# Patient Record
Sex: FEMALE | Race: WHITE | Employment: OTHER | URBAN - METROPOLITAN AREA
[De-identification: names, ages, dates, MRNs, and addresses within clinical notes are randomized per-mention and may not be internally consistent; named-entity substitution may affect disease eponyms.]

---

## 2018-12-13 ENCOUNTER — APPOINTMENT (OUTPATIENT)
Dept: ULTRASOUND IMAGING | Facility: CLINIC | Age: 68
End: 2018-12-13
Attending: EMERGENCY MEDICINE
Payer: COMMERCIAL

## 2018-12-13 ENCOUNTER — APPOINTMENT (OUTPATIENT)
Dept: CT IMAGING | Facility: CLINIC | Age: 68
End: 2018-12-13
Attending: EMERGENCY MEDICINE
Payer: COMMERCIAL

## 2018-12-13 ENCOUNTER — HOSPITAL ENCOUNTER (EMERGENCY)
Facility: CLINIC | Age: 68
Discharge: HOME | End: 2018-12-14
Attending: EMERGENCY MEDICINE | Admitting: EMERGENCY MEDICINE
Payer: COMMERCIAL

## 2018-12-13 VITALS
RESPIRATION RATE: 18 BRPM | WEIGHT: 120 LBS | OXYGEN SATURATION: 99 % | TEMPERATURE: 97.7 F | SYSTOLIC BLOOD PRESSURE: 119 MMHG | DIASTOLIC BLOOD PRESSURE: 70 MMHG

## 2018-12-13 DIAGNOSIS — R10.13 ABDOMINAL PAIN, EPIGASTRIC: ICD-10-CM

## 2018-12-13 LAB
ALBUMIN SERPL-MCNC: 3.9 G/DL (ref 3.4–5)
ALP SERPL-CCNC: 84 U/L (ref 40–150)
ALT SERPL W P-5'-P-CCNC: 24 U/L (ref 0–50)
ANION GAP SERPL CALCULATED.3IONS-SCNC: 5 MMOL/L (ref 3–14)
AST SERPL W P-5'-P-CCNC: 20 U/L (ref 0–45)
BASOPHILS # BLD AUTO: 0.1 10E9/L (ref 0–0.2)
BASOPHILS NFR BLD AUTO: 0.5 %
BILIRUB DIRECT SERPL-MCNC: <0.1 MG/DL (ref 0–0.2)
BILIRUB SERPL-MCNC: 0.2 MG/DL (ref 0.2–1.3)
BUN SERPL-MCNC: 12 MG/DL (ref 7–30)
CALCIUM SERPL-MCNC: 8.6 MG/DL (ref 8.5–10.1)
CHLORIDE SERPL-SCNC: 106 MMOL/L (ref 94–109)
CO2 SERPL-SCNC: 29 MMOL/L (ref 20–32)
CREAT SERPL-MCNC: 0.63 MG/DL (ref 0.52–1.04)
DIFFERENTIAL METHOD BLD: NORMAL
EOSINOPHIL # BLD AUTO: 0.2 10E9/L (ref 0–0.7)
EOSINOPHIL NFR BLD AUTO: 2 %
ERYTHROCYTE [DISTWIDTH] IN BLOOD BY AUTOMATED COUNT: 13.2 % (ref 10–15)
GFR SERPL CREATININE-BSD FRML MDRD: >90 ML/MIN/1.7M2
GLUCOSE SERPL-MCNC: 90 MG/DL (ref 70–99)
HCT VFR BLD AUTO: 44.2 % (ref 35–47)
HGB BLD-MCNC: 14.3 G/DL (ref 11.7–15.7)
IMM GRANULOCYTES # BLD: 0 10E9/L (ref 0–0.4)
IMM GRANULOCYTES NFR BLD: 0.2 %
LIPASE SERPL-CCNC: 334 U/L (ref 73–393)
LYMPHOCYTES # BLD AUTO: 4.7 10E9/L (ref 0.8–5.3)
LYMPHOCYTES NFR BLD AUTO: 46.1 %
MCH RBC QN AUTO: 31 PG (ref 26.5–33)
MCHC RBC AUTO-ENTMCNC: 32.4 G/DL (ref 31.5–36.5)
MCV RBC AUTO: 96 FL (ref 78–100)
MONOCYTES # BLD AUTO: 0.8 10E9/L (ref 0–1.3)
MONOCYTES NFR BLD AUTO: 8 %
NEUTROPHILS # BLD AUTO: 4.4 10E9/L (ref 1.6–8.3)
NEUTROPHILS NFR BLD AUTO: 43.2 %
NRBC # BLD AUTO: 0 10*3/UL
NRBC BLD AUTO-RTO: 0 /100
PLATELET # BLD AUTO: 271 10E9/L (ref 150–450)
POTASSIUM SERPL-SCNC: 3.4 MMOL/L (ref 3.4–5.3)
PROT SERPL-MCNC: 7.5 G/DL (ref 6.8–8.8)
RBC # BLD AUTO: 4.61 10E12/L (ref 3.8–5.2)
SODIUM SERPL-SCNC: 140 MMOL/L (ref 133–144)
TROPONIN I SERPL-MCNC: <0.015 UG/L (ref 0–0.04)
WBC # BLD AUTO: 10.2 10E9/L (ref 4–11)

## 2018-12-13 PROCEDURE — 25000132 ZZH RX MED GY IP 250 OP 250 PS 637: Performed by: EMERGENCY MEDICINE

## 2018-12-13 PROCEDURE — 99285 EMERGENCY DEPT VISIT HI MDM: CPT | Mod: 25

## 2018-12-13 PROCEDURE — 85025 COMPLETE CBC W/AUTO DIFF WBC: CPT | Performed by: EMERGENCY MEDICINE

## 2018-12-13 PROCEDURE — 25000128 H RX IP 250 OP 636: Performed by: EMERGENCY MEDICINE

## 2018-12-13 PROCEDURE — 76705 ECHO EXAM OF ABDOMEN: CPT

## 2018-12-13 PROCEDURE — 83690 ASSAY OF LIPASE: CPT | Performed by: EMERGENCY MEDICINE

## 2018-12-13 PROCEDURE — 83690 ASSAY OF LIPASE: CPT

## 2018-12-13 PROCEDURE — 80048 BASIC METABOLIC PNL TOTAL CA: CPT | Performed by: EMERGENCY MEDICINE

## 2018-12-13 PROCEDURE — 74177 CT ABD & PELVIS W/CONTRAST: CPT

## 2018-12-13 PROCEDURE — 84484 ASSAY OF TROPONIN QUANT: CPT | Performed by: EMERGENCY MEDICINE

## 2018-12-13 PROCEDURE — 25000125 ZZHC RX 250: Performed by: EMERGENCY MEDICINE

## 2018-12-13 PROCEDURE — 80076 HEPATIC FUNCTION PANEL: CPT

## 2018-12-13 RX ORDER — IOPAMIDOL 755 MG/ML
500 INJECTION, SOLUTION INTRAVASCULAR ONCE
Status: COMPLETED | OUTPATIENT
Start: 2018-12-13 | End: 2018-12-13

## 2018-12-13 RX ADMIN — LIDOCAINE HYDROCHLORIDE 30 ML: 20 SOLUTION ORAL; TOPICAL at 21:38

## 2018-12-13 RX ADMIN — IOPAMIDOL 56 ML: 755 INJECTION, SOLUTION INTRAVENOUS at 23:31

## 2018-12-13 RX ADMIN — SODIUM CHLORIDE 74 ML: 9 INJECTION, SOLUTION INTRAVENOUS at 23:31

## 2018-12-13 SDOH — HEALTH STABILITY: MENTAL HEALTH: HOW OFTEN DO YOU HAVE A DRINK CONTAINING ALCOHOL?: NEVER

## 2018-12-13 ASSESSMENT — ENCOUNTER SYMPTOMS
SHORTNESS OF BREATH: 0
FEVER: 0
VOMITING: 0
NAUSEA: 0
ABDOMINAL PAIN: 1
BACK PAIN: 1
DIARRHEA: 0

## 2018-12-13 NOTE — ED AVS SNAPSHOT
[facility] Emergency Department  [address]  [facility] [address]  Phone:  [phone]  Fax:  [phone]                                    Casandra Hager   MRN: [id number]    Department:  [facility] Emergency Department   Date of Visit:  12/13/2018           After Visit Summary Signature Page    I have received my discharge instructions, and my questions have been answered. I have discussed any challenges I see with this plan with the nurse or doctor.    ..........................................................................................................................................  Patient/Patient Representative Signature      ..........................................................................................................................................  Patient Representative Print Name and Relationship to Patient    ..................................................               ................................................  Date                                   Time    ..........................................................................................................................................  Reviewed by Signature/Title    ...................................................              ..............................................  Date                                               Time          22EPIC Rev 08/18

## 2018-12-13 NOTE — ED AVS SNAPSHOT
[facility] EMERGENCY DEPARTMENT: [phone]                                              INTERAGENCY TRANSFER FORM - LAB / IMAGING / EKG / EMG RESULTS   2018                   Hospital Admission Date: 2018  PRANAV PATEL   : 1950  Sex: Female         Attending Provider:  Wagner Jin MD    Allergies:  No Known Allergies    Infection:  None   Service:  EMERGENCY ME    Ht:  --   Wt:  54.4 kg (120 lb)   Admission Wt:  54.4 kg (120 lb)    BMI:  --   BSA:  --            Patient PCP Information     Provider PCP Type    [facility]         Lab Results - 3 Days      Lipase [[id number]]  Resulted: 18, Result status: Final result   Ordering provider:  Unknown, Provider  18 Resulting lab:  [facility]    Specimen Information    Type Source Collected On       18          Components    Component Value Reference Range Flag Lab   Lipase 334 73 - 393 U/L   FrRdHs            Hepatic panel [[id number]]  Resulted: 18, Result status: Final result   Ordering provider:  Unknown, Provider  18 Resulting lab:  [facility]    Specimen Information    Type Source Collected On       18          Components    Component Value Reference Range Flag Lab   Bilirubin Direct <0.1 0.0 - 0.2 mg/dL   FrRdHs   Bilirubin Total 0.2 0.2 - 1.3 mg/dL   FrRdHs   Albumin 3.9 3.4 - 5.0 g/dL   FrRdHs   Protein Total 7.5 6.8 - 8.8 g/dL   FrRdHs   Alkaline Phosphatase 84 40 - 150 U/L   FrRdHs   ALT 24 0 - 50 U/L   FrRdHs   AST 20 0 - 45 U/L   FrRdHs            Basic metabolic panel [[id number]]  Resulted: 18, Result status: Final result   Ordering provider:  Wagner Jin MD  18 Resulting lab:  [facility]    Specimen Information    Type Source Collected On   Blood   18          Components    Component Value Reference Range Flag Lab   Sodium 140 133  - 144 mmol/L   FrRdHs   Potassium 3.4 3.4 - 5.3 mmol/L   FrRdHs   Chloride 106 94 - 109 mmol/L   FrRdHs   Carbon Dioxide 29 20 - 32 mmol/L   FrRdHs   Anion Gap 5 3 - 14 mmol/L   FrRdHs   Glucose 90 70 - 99 mg/dL   FrRdHs   Urea Nitrogen 12 7 - 30 mg/dL   FrRdHs   Creatinine 0.63 0.52 - 1.04 mg/dL   FrRdHs   GFR Estimate >90 >60 mL/min/1.7m2   FrRdHs   Comment:  Non  GFR Calc   GFR Estimate If Black >90 >60 mL/min/1.7m2   FrRdHs   Comment:  African American GFR Calc   Calcium 8.6 8.5 - 10.1 mg/dL   FrRdHs            Troponin I [[id number]]  Resulted: 12/13/18 2108, Result status: Final result   Ordering provider:  Wagner Jin MD  12/13/18 2042 Resulting lab:  [facility]    Specimen Information    Type Source Collected On   Blood   12/13/18 2044          Components    Component Value Reference Range Flag Lab   Troponin I ES <0.015 0.000 - 0.045 ug/L   FrRd   Comment:         The 99th percentile for upper reference range is 0.045 ug/L.  Troponin values   in the range of 0.045 - 0.120 ug/L may be associated with risks of adverse   clinical events.              CBC with platelets differential [[id number]]  Resulted: 12/13/18 2050, Result status: Final result   Ordering provider:  Wagner Jin MD  12/13/18 2042 Resulting lab:  [facility]    Specimen Information    Type Source Collected On   Blood   12/13/18 2044          Components    Component Value Reference Range Flag Lab   WBC 10.2 4.0 - 11.0 10e9/L   FrRdHs   RBC Count 4.61 3.8 - 5.2 10e12/L   FrRdHs   Hemoglobin 14.3 11.7 - 15.7 g/dL   FrRdHs   Hematocrit 44.2 35.0 - 47.0 %   FrRdHs   MCV 96 78 - 100 fl   FrRdHs   MCH 31.0 26.5 - 33.0 pg   FrRdHs   MCHC 32.4 31.5 - 36.5 g/dL   FrRdHs   RDW 13.2 10.0 - 15.0 %   FrRdHs   Platelet Count 271 150 - 450 10e9/L   FrRdHs   Diff Method Automated Method     FrRdHs   % Neutrophils 43.2 %   FrRdHs   % Lymphocytes 46.1 %   FrRdHs   % Monocytes 8.0 %   FrRdHs   %  Eosinophils 2.0 %   FrRdHs   % Basophils 0.5 %   FrRdHs   % Immature Granulocytes 0.2 %   FrRdHs   Nucleated RBCs 0 0 /100   FrRdHs   Absolute Neutrophil 4.4 1.6 - 8.3 10e9/L   FrRdHs   Absolute Lymphocytes 4.7 0.8 - 5.3 10e9/L   FrRdHs   Absolute Monocytes 0.8 0.0 - 1.3 10e9/L   FrRdHs   Absolute Eosinophils 0.2 0.0 - 0.7 10e9/L   FrRdHs   Absolute Basophils 0.1 0.0 - 0.2 10e9/L   FrRdHs   Abs Immature Granulocytes 0.0 0 - 0.4 10e9/L   FrRdHs   Absolute Nucleated RBC 0.0     FrRdHs            Testing Performed By     Lab - Abbreviation Name Director Address Valid Date Range    12 - FrRdHs [facility] Unknown [address] [facility] [address] 05/08/15 1057 - Present            Unresulted Labs (24h ago, onward)    None         Imaging Results - 3 Days      CT Chest (PE) Abdomen Pelvis w Contrast [[id number]]  Resulted: 12/14/18 0008, Result status: Final result   Ordering provider:  Wagner Jin MD  12/13/18 2243 Resulted by:  Alton Higgins MD   Performed:  12/13/18 2329 - 12/13/18 2339 Accession number:  [id number]   Resulting lab:  RADIOLOGY RESULTS   Narrative:  CT CHEST, ABDOMEN AND PELVIS WITH CONTRAST  12/13/2018 11:37 PM     HISTORY: Pleuritic chest pain and abdominal pain.    COMPARISON: None.    TECHNIQUE: Following the uneventful administration of 56 mL Isovue-370  intravenous contrast, helical sections were acquired through the lungs  according to the pulmonary embolism protocol. Helical sections were  acquired from the top of the diaphragm through the pubic symphysis  during portal venous phase. Coronal reconstructions were generated.  Radiation dose for this scan was reduced using automated exposure  control, adjustment of the mA and/or kV according to the patient's  size, or iterative reconstruction technique.    FINDINGS:  Chest: No visualized pulmonary embolus. The aorta is normal in caliber  without dissection. 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 Mild biapical parenchymal scarring. 0.4 cm  nodule in the anterior aspect of the mid right lung (series 5 image  62). 0.5 cm nodule in the posteromedial aspect of the mid left lung  (series 5 image 82). The lungs are otherwise clear. No pleural or  pericardial effusion. No enlarged lymph nodes in the chest. Bilateral  breast prostheses.    Abdomen: The liver, spleen, pancreas, adrenal glands and left kidney  are unremarkable. A subcentimeter low attenuation lesion in the  interpolar region of the right kidney, too small to characterize. The  gallbladder is present. No enlarged lymph nodes or free fluid in the  upper abdomen.    Pelvis: The small and large bowel are normal in caliber. The appendix  is unremarkable. No bowel wall thickening, pneumatosis or free  intraperitoneal gas. The uterus is present. No enlarged lymph nodes or  free fluid in the pelvis.     Impression:  IMPRESSION:  1. No visualized pulmonary embolus or other evidence of active  pulmonary disease.  2. No cause of acute pain identified in the abdomen or pelvis.  3. Two tiny indeterminate pulmonary nodules, the largest measuring 0.5  cm in diameter. Recommend comparison with prior imaging studies, if  available. If not available and the patient is a smoker or has other  significant risk factors, a follow-up CT scan could be considered in  12 months to confirm stability.    HOLGER BRANCH MD      Abdomen US, limited (RUQ only) [[id number]]  Resulted: 12/13/18 2310, Result status: Final result   Ordering provider:  Wagner Jin MD  12/13/18 2122 Resulted by:  Yannick Ac MD   Performed:  12/13/18 2212 - 12/13/18 2229 Accession number:  [id number]   Resulting lab:  RADIOLOGY RESULTS   Narrative:  US ABDOMEN LIMITED 12/13/2018 10:29 PM    CLINICAL INFORMATION: Epigastric and right upper quadrant abdominal  pain.    COMPARISON: None.    FINDINGS: Limited right upper quadrant ultrasound demonstrates a  negative  gallbladder. No gallstones or gallbladder wall thickening. No  bile duct dilatation. Common hepatic duct measures 0.5 cm in diameter.  Negative liver. Visualized portions of the  pancreas are negative. The  visualized portion of the right kidney is unremarkable. No  hydronephrosis on the right.     Impression:  IMPRESSION: No acute sonographic findings in the right upper quadrant.    ERROL MCCULLOUGH MD      Testing Performed By     Lab - Abbreviation Name Director Address Valid Date Range    104 - Rad Rslts RADIOLOGY RESULTS Unknown Unknown 02/16/05 1553 - Present            Encounter-Level Documents:    There are no encounter-level documents.     Order-Level Documents:    There are no order-level documents.

## 2018-12-14 LAB — INTERPRETATION ECG - MUSE: NORMAL

## 2018-12-14 NOTE — ED PROVIDER NOTES
History     Chief Complaint:  Chest Pain and Abdominal Pain     HPI   Casandra Hager is a 68 year old female with a history of hyperlipidemia who presents for evaluation of chest pain and abdominal pain. Approximately one month ago, the patient started to develop intermittent episodes of sharp central chest and upper abdominal pain with radiation to her back. This pain is worse with movement, palpation, and deep breathing. Each episode of chest pain generally lasts for only a few minutes at a time. She has taken a few antacids without improvement of her pain, but she has not been taking any medications consistently to address her pain. Today, her chest pain became constant, prompting her to seek evaluation in the ED. Currently in the ED, the patient rates her pain at a severity of 3/10. She has not had any recent fever, shortness of breath, leg swelling, nausea, vomiting, or diarrhea.     Allergies:  NKDA      Medications:    Lipitor  Albuterol inhaler   Detrol LA   Effexor XR   Acyclovir     Past Medical History:    Atrial fibrillation  Hypercholesteremia   Anxiety  Depression  Osteopenia  Paroxysmal SVT     Past Surgical History:    Knee arthroscopy, bilateral  Bunionectomy, bilateral  Tubal ligation  Mohs surgery   Breast augmentation, bilateral     Family History:    CAD - Mother     Social History:  Tobacco use:    Never smoker   Alcohol use:    Negative   Marital status:       Accompanied to ED by:  Alone      Review of Systems   Constitutional: Negative for fever.   Respiratory: Negative for shortness of breath.    Cardiovascular: Positive for chest pain. Negative for leg swelling.   Gastrointestinal: Positive for abdominal pain. Negative for diarrhea, nausea and vomiting.   Musculoskeletal: Positive for back pain.   All other systems reviewed and are negative.      Physical Exam   First Vitals:  BP: 159/75  Heart Rate: 69  Temp: 97.7  F (36.5  C)  Resp: 18  Weight: 54.4 kg (120 lb)  SpO2: 100  %      Physical Exam  Nursing note and vitals reviewed.  Constitutional: Cooperative.   HENT:   Mouth/Throat: Moist mucous membranes.   Eyes: EOMI, nonicteric sclera  Cardiovascular: Normal rate, regular rhythm, no murmurs, rubs, or gallops  Pulmonary/Chest: Effort normal and breath sounds normal. No respiratory distress. No wheezes. No rales.   Abdominal: Soft.Pain to palpation bilateral upper quadrants with mild guarding. nondistended, no rigidity. BS present.   Musculoskeletal: Normal range of motion.   Neurological: Alert. Moves all extremities spontaneously.   Skin: Skin is warm and dry. No rash noted.   Psychiatric: Normal mood and affect.       Emergency Department Course     Imaging:  Radiographic findings were communicated with the patient who voiced understanding of the findings.    CT Chest PE, Abdomen Pelvis w Contrast:  IMPRESSION:  1. No visualized pulmonary embolus or other evidence of active pulmonary disease.  2. No cause of acute pain identified in the abdomen or pelvis.  3. Two tiny indeterminate pulmonary nodules, the largest measuring 0.5 cm in diameter. Recommend comparison with prior imaging studies, if available. If not available and the patient is a smoker or has other significant risk factors, a follow-up CT scan could be considered in 12 months to confirm stability.  Imaging independently reviewed and agree with radiologist interpretation.      Abdomen US, Limited (RUQ only):  IMPRESSION: No acute sonographic findings in the right upper quadrant.  Per radiology.     Laboratory:  CBC: WNL (WBC 10.2, HGB 14.3, )   BMP: WNL (Creatinine 0.63)  Lipase: 334   Hepatic panel: WNL   Troponin I 2044: <0.015     Interventions:  2138 GI cocktail 30 mL PO     Emergency Department Course:  Nursing notes and vitals reviewed.  2115: I performed an exam of the patient as documented above.     0014: I updated and reassessed the patient.     I personally reviewed the laboratory results with the  patient and answered all related questions prior to discharge.     Findings and plan explained to the Patient. Patient discharged home with instructions regarding supportive care, medications, and reasons to return. The importance of close follow-up was reviewed. The patient was prescribed Omeprazole and Ranitidine.      Impression & Plan      Medical Decision Making:  Pt presents with CC chest pain, however on exam, she's focally tender and guarding in her upper abdomen. Broad ddx considered including gastritis, GERD, cholecystitis, choledocolithiasis, biliary colic, pancreatitis, colonic or small bowel pathology, obstruction, ileus, vascular etiologies including aneurysm, ulcer, tumor. Also considered thoracic causes such as ACS, PE. Workup as above is negative for emergent etiology. Given duration of pain, I believe a single negative troponin is sufficient to rule out ACS.  I'm unsure what to make of pt's abd pain with guarding. Given negative CT/US, most likely represents gastric etiology. Will start pt on acid blocking medications, but discussed that she may require GI follow-up. She is in stable condition at the time of discharge, indications for return to the ED were discussed as well as follow up. All questions were answered and she is in agreement with the plan.        Diagnosis:    ICD-10-CM   1. Abdominal pain, epigastric R10.13       Disposition:  Discharged to home with Omeprazole and Ranitidine.     Discharge Medications:  omeprazole 20 MG DR capsule  Commonly known as:  priLOSEC  20 mg, Oral, DAILY     ranitidine 150 MG tablet  Commonly known as:  ZANTAC  150 mg, Oral, 2 TIMES DAILY            Lee MOSELEY am serving as a scribe at 9:15 PM on 12/13/2018 to document services personally performed by Dr. Jin, based on my observations and the provider's statements to me.    [facility] EMERGENCY DEPARTMENT       Wagner Jin MD  12/15/18 0056

## 2018-12-14 NOTE — ED TRIAGE NOTES
C/O CP sharp intermittent for past month without precip factor or radiation. Pt reports 2-3 nights ago pt awoke with LUE numbness and radiated pain that resolved overnight.  Denies SOB, but dizziness at times. Pt reports today pain constant ABC in tact. A/Ox4